# Patient Record
Sex: FEMALE | NOT HISPANIC OR LATINO | Employment: FULL TIME | URBAN - METROPOLITAN AREA
[De-identification: names, ages, dates, MRNs, and addresses within clinical notes are randomized per-mention and may not be internally consistent; named-entity substitution may affect disease eponyms.]

---

## 2024-09-26 ENCOUNTER — OFFICE VISIT (OUTPATIENT)
Dept: URGENT CARE | Age: 24
End: 2024-09-26
Payer: COMMERCIAL

## 2024-09-26 VITALS
TEMPERATURE: 98 F | RESPIRATION RATE: 16 BRPM | OXYGEN SATURATION: 98 % | DIASTOLIC BLOOD PRESSURE: 81 MMHG | HEART RATE: 83 BPM | SYSTOLIC BLOOD PRESSURE: 120 MMHG

## 2024-09-26 DIAGNOSIS — R07.89 OTHER CHEST PAIN: Primary | ICD-10-CM

## 2024-09-26 PROCEDURE — 99203 OFFICE O/P NEW LOW 30 MIN: CPT | Performed by: NURSE PRACTITIONER

## 2024-09-26 PROCEDURE — 93000 ELECTROCARDIOGRAM COMPLETE: CPT | Performed by: NURSE PRACTITIONER

## 2024-09-26 NOTE — PROGRESS NOTES
Subjective   Patient ID: Lashawn Voss is a 23 y.o. female. They present today with a chief complaint of Chest Pain (Chest pain intermittent x 1 week/Travels on both sides of chest, by ribs, back and shoulders).    History of Present Illness  22 yo female coming in for intermittent chest pain for the last week. She states she has not had any shortness of breath but states that she has been getting alerts from her apple watch that her heart rate has gone into the 200's. She states today the pain was a little worse and she decided to come in.     Past Medical History  Allergies as of 09/26/2024 - Reviewed 09/26/2024   Allergen Reaction Noted    Azithromycin GI Upset 09/26/2024    Ciprofloxacin Nausea Only and GI Upset 10/25/2023       (Not in a hospital admission)       Past Medical History:   Diagnosis Date    Personal history of other specified conditions 09/21/2021    History of headache       History reviewed. No pertinent surgical history.         Review of Systems  Review of Systems:  General: No weight loss, fatigue, anorexia, insomnia, fever, chills.  Cardiac: Positive intermittent upper chest pain, no palpitations, syncope, near syncope.  Pulmonary:  No shortness of breath, cough, hemoptysis  Heme/lymph: No swollen glands, fever, bleeding  Musculoskeletal: No limb pain, joint pain, joint swelling.  Skin: No rashes  Neuro: No numbness, tingling, headaches                                 Objective    Vitals:    09/26/24 1351   BP: 120/81   Pulse: 83   Resp: 16   Temp: 36.7 °C (98 °F)   SpO2: 98%     No LMP recorded.    Physical Exam  Physical Exam:  General: Vital noted, no distress. Afebrile  Cardiac: Regular rate and rhythm, no murmur  Pulmonary: Lungs clear bilaterally with good aeration. No adventitious breath sounds.  Abdomen: Soft, nontender, nonsurgical. No peritoneal signs. Normoactive bowel sounds.   Musculoskeletal: No peripheral edema.   Skin: No rashes  Neuro: No focal neurologic deficits, NIH  score of 0.      Procedures    Point of Care Test & Imaging Results from this visit            Diagnostic study results (if any) were reviewed by HOOD Mejia.    Assessment/Plan   Allergies, medications, history, and pertinent labs/EKGs/Imaging reviewed by HOOD Mejia.     Medical Decision Making  Testing: EKG- SR, no ectopy  Treatment: Cardiology referral given. Instructed patient that if symptoms return she needed to go to ER for further evaluation  Differential: 1) nonspecific chest pain, 2) PE , 3) pleurisy  Plan: Discussed differential with the pateint. Patient will follow up with the PCP in the next 2-3 days. Return for any worsening symptoms or go to the ER for further evaluation. Patient understands return precautions and discharege insturctions.  Impression:   1) nonspecific chest pain      Orders and Diagnoses  Diagnoses and all orders for this visit:  Other chest pain  -     ECG 12 Lead      Medical Admin Record      Patient disposition: Home    Electronically signed by HOOD Mejia  2:10 PM

## 2024-10-08 ENCOUNTER — HOSPITAL ENCOUNTER (OUTPATIENT)
Dept: RADIOLOGY | Facility: CLINIC | Age: 24
Discharge: HOME | End: 2024-10-08
Payer: COMMERCIAL

## 2024-10-08 ENCOUNTER — APPOINTMENT (OUTPATIENT)
Dept: CARDIOLOGY | Facility: CLINIC | Age: 24
End: 2024-10-08
Payer: COMMERCIAL

## 2024-10-08 ENCOUNTER — TELEPHONE (OUTPATIENT)
Dept: CARDIOLOGY | Facility: CLINIC | Age: 24
End: 2024-10-08

## 2024-10-08 VITALS
WEIGHT: 143.9 LBS | BODY MASS INDEX: 23.97 KG/M2 | DIASTOLIC BLOOD PRESSURE: 74 MMHG | HEIGHT: 65 IN | HEART RATE: 93 BPM | SYSTOLIC BLOOD PRESSURE: 109 MMHG | TEMPERATURE: 98 F

## 2024-10-08 DIAGNOSIS — Z86.711 HISTORY OF PULMONARY EMBOLISM: ICD-10-CM

## 2024-10-08 DIAGNOSIS — R07.89 OTHER CHEST PAIN: ICD-10-CM

## 2024-10-08 DIAGNOSIS — Z86.711 HISTORY OF PULMONARY EMBOLISM: Primary | ICD-10-CM

## 2024-10-08 PROBLEM — R07.9 CHEST PAIN: Status: ACTIVE | Noted: 2024-10-08

## 2024-10-08 PROBLEM — J45.909 ASTHMA: Status: ACTIVE | Noted: 2024-10-08

## 2024-10-08 LAB
CREAT SERPL-MCNC: 0.8 MG/DL (ref 0.6–1.3)
GFR SERPL CREATININE-BSD FRML MDRD: >90 ML/MIN/1.73M*2

## 2024-10-08 PROCEDURE — 71275 CT ANGIOGRAPHY CHEST: CPT | Performed by: STUDENT IN AN ORGANIZED HEALTH CARE EDUCATION/TRAINING PROGRAM

## 2024-10-08 PROCEDURE — 99205 OFFICE O/P NEW HI 60 MIN: CPT | Performed by: INTERNAL MEDICINE

## 2024-10-08 PROCEDURE — 3008F BODY MASS INDEX DOCD: CPT | Performed by: INTERNAL MEDICINE

## 2024-10-08 PROCEDURE — 2550000001 HC RX 255 CONTRASTS: Performed by: INTERNAL MEDICINE

## 2024-10-08 PROCEDURE — 1036F TOBACCO NON-USER: CPT | Performed by: INTERNAL MEDICINE

## 2024-10-08 PROCEDURE — 71275 CT ANGIOGRAPHY CHEST: CPT

## 2024-10-08 PROCEDURE — 82565 ASSAY OF CREATININE: CPT

## 2024-10-08 PROCEDURE — 99215 OFFICE O/P EST HI 40 MIN: CPT | Performed by: INTERNAL MEDICINE

## 2024-10-08 RX ORDER — FLUTICASONE PROPIONATE 50 MCG
2 SPRAY, SUSPENSION (ML) NASAL NIGHTLY
COMMUNITY

## 2024-10-08 RX ORDER — IPRATROPIUM BROMIDE AND ALBUTEROL SULFATE 2.5; .5 MG/3ML; MG/3ML
SOLUTION RESPIRATORY (INHALATION)
COMMUNITY

## 2024-10-08 RX ORDER — ALBUTEROL SULFATE 90 UG/1
2 INHALANT RESPIRATORY (INHALATION) EVERY 4 HOURS PRN
COMMUNITY
Start: 2023-01-03

## 2024-10-08 RX ORDER — LORATADINE 10 MG/1
1 TABLET ORAL DAILY
COMMUNITY

## 2024-10-08 RX ORDER — FLUCONAZOLE 150 MG/1
TABLET ORAL
COMMUNITY
Start: 2024-08-24

## 2024-10-08 RX ORDER — DROSPIRENONE 4 MG/1
1 TABLET, FILM COATED ORAL DAILY
COMMUNITY

## 2024-10-08 NOTE — PROGRESS NOTES
Chief Complaint:   Chest Pain     History of Present Illness     Lashawn Voss is a 23 y.o. female presenting with chest pain.  The patient complains that for the past 2 weeks ago , they have experienced substernal lower back and sharp chest discomfort that lasts 2 hrs.   She has noticed an elevated HR.  Seen in urgent care and ECG done negative - not sent to ED.  The discomfort is exacerbated by lying down and moving around a lot and relieved by none.  The discomfort is not changing. The patient experiences associated shortness of breath, nausea, dizziness, and diaphoresis.  The pain is reproduced by palpation.  The patient has no history of known coronary artery disease.  The patient has not had a stress test within the last 12 months and has never had cardiac catheterization.  The patient exercises and does not experience chest discomfort with exertion.  There is no history of aortic aneurysm.  The patient denies any systemic complaints including fever. Hx of mother with thrombophilia (MTHFR) and has been on anticoagulation.  Patient had PE 5 years ago post-op and on OCP.No FHX of CAD, HTN, DM, smoking.  Back on OCP (Non estrogen) - stopped 1 month ago.    Review of Systems  All pertinent systems have been reviewed and are negative except for what is stated in the history of present illness.    All other systems have been reviewed and are negative and noncontributory to this patient's current ailments.  .       Previous History     Past Medical History:  She has a past medical history of Asthma (10/08/2024), Chest pain (10/08/2024), History of pulmonary embolism (10/08/2024), and Personal history of other specified conditions (09/21/2021).    Past Surgical History:  She has no past surgical history on file.      Social History:  She reports that she quit smoking about 9 months ago. Her smoking use included cigarettes. She has never used smokeless tobacco. She reports that she does not drink alcohol and does not  "use drugs.    Family History:  No family history on file.     Allergies:  Azithromycin and Ciprofloxacin    Outpatient Medications:  Current Outpatient Medications   Medication Instructions    albuterol 90 mcg/actuation inhaler 2 puffs, oral, Every 4 hours PRN    fluconazole (Diflucan) 150 mg tablet Take 1 tab PO weekly x 26 weeks.    fluticasone (Flonase) 50 mcg/actuation nasal spray 2 sprays, Each Nostril, Nightly    ipratropium-albuteroL (Duo-Neb) 0.5-2.5 mg/3 mL nebulizer solution USE 3 ML VIA NEBULIZER EVERY 4 TO 6 HOURS AS NEEDED    loratadine (Claritin) 10 mg tablet 1 tablet, oral, Daily    Slynd 4 mg (28) tablet 1 tablet, oral, Daily       Physical Examination   Vitals:  Visit Vitals  /74   Pulse 93   Temp 36.7 °C (98 °F)   Ht 1.651 m (5' 5\")   Wt 65.3 kg (143 lb 14.4 oz)   BMI 23.95 kg/m²   Smoking Status Former   BSA 1.73 m²    Physical Exam  Vitals reviewed.   Constitutional:       General: She is not in acute distress.     Appearance: Normal appearance.   HENT:      Head: Normocephalic and atraumatic.      Nose: Nose normal.   Eyes:      Conjunctiva/sclera: Conjunctivae normal.   Cardiovascular:      Rate and Rhythm: Normal rate and regular rhythm.      Pulses: Normal pulses.      Heart sounds: No murmur heard.  Pulmonary:      Effort: Pulmonary effort is normal. No respiratory distress.      Breath sounds: Normal breath sounds. No wheezing, rhonchi or rales.   Abdominal:      General: Bowel sounds are normal. There is no distension.      Palpations: Abdomen is soft.      Tenderness: There is no abdominal tenderness.   Musculoskeletal:         General: No swelling.      Right lower leg: No edema.      Left lower leg: No edema.   Skin:     General: Skin is warm and dry.      Capillary Refill: Capillary refill takes less than 2 seconds.   Neurological:      General: No focal deficit present.      Mental Status: She is alert.   Psychiatric:         Mood and Affect: Mood normal.           " "  Labs/Imaging/Cardiac Studies     Last Labs:  CBC -  Lab Results   Component Value Date    WBC 6.5 08/18/2021    HGB 14.6 08/18/2021    HCT 43.3 08/18/2021    MCV 87 08/18/2021     08/18/2021       CMP -  Lab Results   Component Value Date    CALCIUM 9.8 08/18/2021    PROT 6.9 08/16/2021    ALBUMIN 4.2 08/16/2021    AST 13 08/16/2021    ALT 11 08/16/2021    ALKPHOS 26 (L) 08/16/2021    BILITOT 0.4 08/16/2021       LIPID PANEL -   No results found for: \"CHOL\", \"HDL\", \"CHHDL\", \"LDL\", \"VLDL\", \"TRIG\", \"NHDL\"    RENAL FUNCTION PANEL -   Lab Results   Component Value Date    K 4.0 08/18/2021       Lab Results   Component Value Date    BNP 15 10/09/2020    HGBA1C 5.2 09/02/2022       ECG:    Echo:  No echocardiogram results found for the past 12 months       Assessment and Recommendations     Assessment/Plan     1. Other chest pain  The patient presents with atypical, chest pain.  The ECG is non-ischemic.  The differential diagnosis includes CAD, gastrointestinal, pulmonary, and musculoskeletal etiologies.   The ECG shows no evidence of ischemia.  An outpatient evaluation of the patient's chest pain is appropriate with a CTPA (urgent) which will be scheduled as soon as can be arranged. For severe and/or prolonged chest pain, the patient was instructed to call 911. Will do Echo if CT negative.    - Referral to Cardiology    2. History of pulmonary embolism  Provoked with prolonged bedrest after surgery and on OCP. CT PA now.         Sage Doshi MD    Exclusive of any other services or procedures performed, I, Sage Doshi MD , spent 30 minutes in duration for this visit today.  This time consisted of chart review, obtaining history, and/or performing the exam as documented above as well as documenting the clinical information for the encounter in the electronic record, discussing treatment options, plans, and/or goals with patient, family, and/or caregiver, refilling medications, updating the electronic record, " ordering medicines, lab work, imaging, referrals, and/or procedures as documented above and communicating with other [facility] professionals. I have discussed the results of laboratory, radiology, and cardiology studies with the patient and their family/caregiver.

## 2024-10-16 ENCOUNTER — HOSPITAL ENCOUNTER (OUTPATIENT)
Dept: CARDIOLOGY | Facility: CLINIC | Age: 24
Discharge: HOME | End: 2024-10-16
Payer: COMMERCIAL

## 2024-10-16 DIAGNOSIS — R07.89 OTHER CHEST PAIN: ICD-10-CM

## 2024-10-16 DIAGNOSIS — R07.9 CHEST PAIN, UNSPECIFIED: ICD-10-CM

## 2024-10-16 DIAGNOSIS — Z86.711 HISTORY OF PULMONARY EMBOLISM: ICD-10-CM

## 2024-10-16 LAB
AORTIC VALVE PEAK VELOCITY: 1.25 M/S
AV PEAK GRADIENT: 6.2 MMHG
AVA (PEAK VEL): 2.38 CM2
EJECTION FRACTION: 58 %
LEFT ATRIUM VOLUME AREA LENGTH INDEX BSA: 12.8 ML/M2
LEFT VENTRICLE INTERNAL DIMENSION DIASTOLE: 4.33 CM (ref 3.5–6)
LEFT VENTRICULAR OUTFLOW TRACT DIAMETER: 1.86 CM
MITRAL VALVE E/A RATIO: 1.94
RIGHT VENTRICLE FREE WALL PEAK S': 14 CM/S
TRICUSPID ANNULAR PLANE SYSTOLIC EXCURSION: 2.4 CM

## 2024-10-16 PROCEDURE — 93306 TTE W/DOPPLER COMPLETE: CPT | Performed by: INTERNAL MEDICINE

## 2024-10-16 PROCEDURE — 93306 TTE W/DOPPLER COMPLETE: CPT

## 2024-10-17 ENCOUNTER — TELEPHONE (OUTPATIENT)
Dept: CARDIOLOGY | Facility: CLINIC | Age: 24
End: 2024-10-17
Payer: COMMERCIAL

## 2024-12-13 ENCOUNTER — OFFICE VISIT (OUTPATIENT)
Dept: URGENT CARE | Age: 24
End: 2024-12-13
Payer: COMMERCIAL

## 2024-12-13 VITALS
DIASTOLIC BLOOD PRESSURE: 74 MMHG | OXYGEN SATURATION: 98 % | SYSTOLIC BLOOD PRESSURE: 109 MMHG | HEART RATE: 97 BPM | RESPIRATION RATE: 16 BRPM | TEMPERATURE: 99.2 F

## 2024-12-13 DIAGNOSIS — R11.2 NAUSEA AND VOMITING, UNSPECIFIED VOMITING TYPE: Primary | ICD-10-CM

## 2024-12-13 DIAGNOSIS — R19.7 DIARRHEA, UNSPECIFIED TYPE: ICD-10-CM

## 2024-12-13 RX ORDER — DICYCLOMINE HYDROCHLORIDE 10 MG/1
10 CAPSULE ORAL 3 TIMES DAILY
Qty: 42 CAPSULE | Refills: 0 | Status: SHIPPED | OUTPATIENT
Start: 2024-12-13 | End: 2024-12-27

## 2024-12-13 RX ORDER — ONDANSETRON 4 MG/1
4 TABLET, ORALLY DISINTEGRATING ORAL EVERY 8 HOURS PRN
Qty: 20 TABLET | Refills: 0 | Status: SHIPPED | OUTPATIENT
Start: 2024-12-13 | End: 2024-12-20

## 2024-12-13 NOTE — LETTER
December 13, 2024     Patient: Lashawn Voss   YOB: 2000   Date of Visit: 12/13/2024       To Whom It May Concern:    Lashawn Voss was seen in my clinic on 12/13/2024 at 9:10 am. Please excuse Lashawn for her absence from work on this day to make the appointment.    If you have any questions or concerns, please don't hesitate to call.         Sincerely,         Jaime Ogden, APRN-CNP        CC: No Recipients

## 2024-12-13 NOTE — PROGRESS NOTES
"Subjective   Patient ID: Lashawn Voss is a 24 y.o. female. They present today with a chief complaint of Vomiting.    History of Present Illness  HPI a 24-year-old female arrives to the clinic with chief complaint of nausea, vomiting, diarrhea.  The patient reports having the symptoms over the last few days.  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  She states that she would have loose stools every 30 to 45 minutes.  She reports no BRBPR or hematuria.  She reports no chest pain, shortness of breath.    Past Medical History  Allergies as of 12/13/2024 - Reviewed 12/13/2024   Allergen Reaction Noted    Azithromycin GI Upset 09/26/2024    Ciprofloxacin Nausea Only and GI Upset 10/25/2023       (Not in a hospital admission)       Past Medical History:   Diagnosis Date    Asthma 10/08/2024    Chest pain 10/08/2024    Seen in  9/26/24      History of pulmonary embolism 10/08/2024    Personal history of other specified conditions 09/21/2021    History of headache       No past surgical history on file.     reports that she quit smoking about a year ago. Her smoking use included cigarettes. She has never used smokeless tobacco. She reports that she does not drink alcohol and does not use drugs.    Review of Systems  Review of Systems    Appetite change, activity change, nausea, vomiting, diarrhea  Objective    Vitals:    12/13/24 0931   BP: 109/74   Pulse: 97   Resp: 16   Temp: 37.3 °C (99.2 °F)   SpO2: 98%     No LMP recorded.    Physical Exam  Unremarkable  Procedures    Point of Care Test & Imaging Results from this visit  No results found for this visit on 12/13/24.   No results found.    Diagnostic study results (if any) were reviewed by HOOD Meza.    Assessment/Plan   Allergies, medications, history, and pertinent labs/EKGs/Imaging reviewed by HOOD Meza.     Medical Decision Making  Upon initial assessment, the patient was " sitting calmly in the bedside chair in no acute distress.  Physical examination is essentially benign.  The patient is able to verbalize all complaints.  No pain or tenderness to her abdominal area.  Given her symptoms, we will treat her for viral gastroenteritis.  I did send over Bentyl and Zofran.  Follow-up with primary care provider.    As a result of the work-up, the patient was discharged home.  she was informed of her diagnosis and instructed to come back with any concerns or worsening of condition.  she and was agreeable to the plan as discussed above.  she was given the opportunity to ask questions.  All of the patient's questions were answered.    This document was generated using the assistance of voice recognition software. If there are any errors of spelling, grammar, syntax, or meaning; please feel free to contact me directly for clarification.     Orders and Diagnoses  Diagnoses and all orders for this visit:  Nausea and vomiting, unspecified vomiting type  -     dicyclomine (Bentyl) 10 mg capsule; Take 1 capsule (10 mg) by mouth 3 times a day for 14 days.  -     ondansetron ODT (Zofran-ODT) 4 mg disintegrating tablet; Dissolve 1 tablet (4 mg) in the mouth every 8 hours if needed for nausea or vomiting for up to 7 days.  Diarrhea, unspecified type  -     dicyclomine (Bentyl) 10 mg capsule; Take 1 capsule (10 mg) by mouth 3 times a day for 14 days.  -     ondansetron ODT (Zofran-ODT) 4 mg disintegrating tablet; Dissolve 1 tablet (4 mg) in the mouth every 8 hours if needed for nausea or vomiting for up to 7 days.      Medical Admin Record      Patient disposition: Home    Electronically signed by HOOD Meza  9:39 AM